# Patient Record
Sex: FEMALE | Race: BLACK OR AFRICAN AMERICAN | URBAN - METROPOLITAN AREA
[De-identification: names, ages, dates, MRNs, and addresses within clinical notes are randomized per-mention and may not be internally consistent; named-entity substitution may affect disease eponyms.]

---

## 2020-07-25 ENCOUNTER — TELEPHONE ENCOUNTER (OUTPATIENT)
Dept: URBAN - METROPOLITAN AREA CLINIC 13 | Facility: CLINIC | Age: 60
End: 2020-07-25

## 2020-07-25 RX ORDER — HYOSCYAMINE SULFATE 0.12 MG/1
PLACE 1 TABLET UNDER THE TONGUE EVERY 4 TO 6 HOURS AS NEEDED TABLET, ORALLY DISINTEGRATING ORAL
Qty: 30 | Refills: 2 | OUTPATIENT
Start: 2018-04-04 | End: 2018-04-25

## 2020-07-25 RX ORDER — CIPROFLOXACIN HYDROCHLORIDE 500 MG/1
TAKE 1 TABLET TWICE DAILY TABLET, FILM COATED ORAL
Qty: 6 | Refills: 0 | OUTPATIENT
End: 2018-06-06

## 2020-07-26 ENCOUNTER — TELEPHONE ENCOUNTER (OUTPATIENT)
Dept: URBAN - METROPOLITAN AREA CLINIC 13 | Facility: CLINIC | Age: 60
End: 2020-07-26

## 2020-07-26 RX ORDER — PSYLLIUM SEED
TAKE AS DIRECTED PACKET (EA) ORAL
Refills: 0 | Status: ACTIVE | COMMUNITY

## 2020-07-26 RX ORDER — IBUPROFEN 800 MG/1
TAKE 1 TABLET TWICE DAILY AS NEEDED TABLET ORAL
Refills: 0 | Status: ACTIVE | COMMUNITY

## 2020-07-26 RX ORDER — HYDROCHLOROTHIAZIDE 25 MG/1
TAKE 1 TABLET DAILY TABLET ORAL
Refills: 0 | Status: ACTIVE | COMMUNITY

## 2020-07-26 RX ORDER — PANTOPRAZOLE SODIUM 40 MG
TAKE 1 TABLET DAILY TABLET, DELAYED RELEASE (ENTERIC COATED) ORAL
Refills: 0 | Status: ACTIVE | COMMUNITY

## 2020-07-26 RX ORDER — CLONAZEPAM 0.25 MG/1
PLACE 1 TABLET ON TONGUE AND ALLOW TO DISSOLVE 3 TIMES DAILY AS NEEDED TABLET, ORALLY DISINTEGRATING ORAL
Qty: 30 | Refills: 3 | Status: ACTIVE | COMMUNITY
Start: 2018-04-09

## 2020-07-26 RX ORDER — METFORMIN HYDROCHLORIDE 500 MG/1
TAKE 1 TABLET EVERY 12 HOURS WITH FOOD TABLET, COATED ORAL
Refills: 0 | Status: ACTIVE | COMMUNITY

## 2020-07-26 RX ORDER — HYDROCODONE BITARTRATE AND ACETAMINOPHEN 5; 325 MG/1; MG/1
TAKE 1 TABLET EVERY 4 TO 6 HOURS AS NEEDED FOR PAIN TABLET ORAL
Refills: 0 | Status: ACTIVE | COMMUNITY

## 2020-07-26 RX ORDER — METOPROLOL SUCCINATE 50 MG/1
TAKE 1 TABLET TWICE DAILY TABLET, EXTENDED RELEASE ORAL
Refills: 0 | Status: ACTIVE | COMMUNITY

## 2020-07-26 RX ORDER — SIMVASTATIN 40 MG/1
TAKE 1 TABLET DAILY AS DIRECTED TABLET, FILM COATED ORAL
Refills: 0 | Status: ACTIVE | COMMUNITY

## 2021-03-01 ENCOUNTER — OFFICE VISIT (OUTPATIENT)
Dept: URBAN - METROPOLITAN AREA CLINIC 113 | Facility: CLINIC | Age: 61
End: 2021-03-01

## 2021-03-01 RX ORDER — METOPROLOL SUCCINATE 50 MG/1
TAKE 1 TABLET TWICE DAILY TABLET, EXTENDED RELEASE ORAL
Refills: 0 | Status: ACTIVE | COMMUNITY

## 2021-03-01 RX ORDER — PSYLLIUM SEED
TAKE AS DIRECTED PACKET (EA) ORAL
Refills: 0 | Status: ACTIVE | COMMUNITY

## 2021-03-01 RX ORDER — SIMVASTATIN 40 MG/1
TAKE 1 TABLET DAILY AS DIRECTED TABLET, FILM COATED ORAL
Refills: 0 | Status: ACTIVE | COMMUNITY

## 2021-03-01 RX ORDER — HYDROCODONE BITARTRATE AND ACETAMINOPHEN 5; 325 MG/1; MG/1
TAKE 1 TABLET EVERY 4 TO 6 HOURS AS NEEDED FOR PAIN TABLET ORAL
Refills: 0 | Status: ACTIVE | COMMUNITY

## 2021-03-01 RX ORDER — PANTOPRAZOLE SODIUM 40 MG
TAKE 1 TABLET DAILY TABLET, DELAYED RELEASE (ENTERIC COATED) ORAL
Refills: 0 | Status: ACTIVE | COMMUNITY

## 2021-03-01 RX ORDER — METFORMIN HYDROCHLORIDE 500 MG/1
TAKE 1 TABLET EVERY 12 HOURS WITH FOOD TABLET, COATED ORAL
Refills: 0 | Status: ACTIVE | COMMUNITY

## 2021-03-01 RX ORDER — IBUPROFEN 800 MG/1
TAKE 1 TABLET TWICE DAILY AS NEEDED TABLET ORAL
Refills: 0 | Status: ACTIVE | COMMUNITY

## 2021-03-01 RX ORDER — HYDROCHLOROTHIAZIDE 25 MG/1
TAKE 1 TABLET DAILY TABLET ORAL
Refills: 0 | Status: ACTIVE | COMMUNITY

## 2021-03-01 RX ORDER — CLONAZEPAM 0.25 MG/1
PLACE 1 TABLET ON TONGUE AND ALLOW TO DISSOLVE 3 TIMES DAILY AS NEEDED TABLET, ORALLY DISINTEGRATING ORAL
Qty: 30 | Refills: 3 | Status: ACTIVE | COMMUNITY
Start: 2018-04-09

## 2021-03-12 ENCOUNTER — OFFICE VISIT (OUTPATIENT)
Dept: URBAN - METROPOLITAN AREA CLINIC 113 | Facility: CLINIC | Age: 61
End: 2021-03-12

## 2021-03-12 RX ORDER — HYDROCODONE BITARTRATE AND ACETAMINOPHEN 5; 325 MG/1; MG/1
TAKE 1 TABLET EVERY 4 TO 6 HOURS AS NEEDED FOR PAIN TABLET ORAL
Refills: 0 | Status: ACTIVE | COMMUNITY

## 2021-03-12 RX ORDER — PSYLLIUM SEED
TAKE AS DIRECTED PACKET (EA) ORAL
Refills: 0 | Status: ACTIVE | COMMUNITY

## 2021-03-12 RX ORDER — CLONAZEPAM 0.25 MG/1
PLACE 1 TABLET ON TONGUE AND ALLOW TO DISSOLVE 3 TIMES DAILY AS NEEDED TABLET, ORALLY DISINTEGRATING ORAL
Qty: 30 | Refills: 3 | Status: ACTIVE | COMMUNITY
Start: 2018-04-09

## 2021-03-12 RX ORDER — HYDROCHLOROTHIAZIDE 25 MG/1
TAKE 1 TABLET DAILY TABLET ORAL
Refills: 0 | Status: ACTIVE | COMMUNITY

## 2021-03-12 RX ORDER — SIMVASTATIN 40 MG/1
TAKE 1 TABLET DAILY AS DIRECTED TABLET, FILM COATED ORAL
Refills: 0 | Status: ACTIVE | COMMUNITY

## 2021-03-12 RX ORDER — METFORMIN HYDROCHLORIDE 500 MG/1
TAKE 1 TABLET EVERY 12 HOURS WITH FOOD TABLET, COATED ORAL
Refills: 0 | Status: ACTIVE | COMMUNITY

## 2021-03-12 RX ORDER — METOPROLOL SUCCINATE 50 MG/1
TAKE 1 TABLET TWICE DAILY TABLET, EXTENDED RELEASE ORAL
Refills: 0 | Status: ACTIVE | COMMUNITY

## 2021-03-12 RX ORDER — PANTOPRAZOLE SODIUM 40 MG
TAKE 1 TABLET DAILY TABLET, DELAYED RELEASE (ENTERIC COATED) ORAL
Refills: 0 | Status: ACTIVE | COMMUNITY

## 2021-03-12 RX ORDER — IBUPROFEN 800 MG/1
TAKE 1 TABLET TWICE DAILY AS NEEDED TABLET ORAL
Refills: 0 | Status: ACTIVE | COMMUNITY

## 2021-03-12 NOTE — HPI-TODAY'S VISIT:
This is a 61-year-old female with a history of diverticulitis, presenting for evaluation of "bowel issues." She was last seen in the office in June 2018 following colonoscopy which was notable for sigmoid and descending colon diverticulosis, grade 1 internal hemorrhoids.  No other abnormalities noted.  Repeat screening was recommended in 2028.  Regarding complaints of constipation, she was recommended a trial of MiraLAX.  She was to continue dietary modification for diverticulosis including avoidance of nuts and seeds.

## 2021-04-15 ENCOUNTER — OFFICE VISIT (OUTPATIENT)
Dept: URBAN - METROPOLITAN AREA CLINIC 113 | Facility: CLINIC | Age: 61
End: 2021-04-15
Payer: COMMERCIAL

## 2021-04-15 ENCOUNTER — WEB ENCOUNTER (OUTPATIENT)
Dept: URBAN - METROPOLITAN AREA CLINIC 113 | Facility: CLINIC | Age: 61
End: 2021-04-15

## 2021-04-15 VITALS
RESPIRATION RATE: 18 BRPM | SYSTOLIC BLOOD PRESSURE: 118 MMHG | HEIGHT: 65 IN | BODY MASS INDEX: 30.66 KG/M2 | WEIGHT: 184 LBS | DIASTOLIC BLOOD PRESSURE: 78 MMHG | HEART RATE: 74 BPM | TEMPERATURE: 98.1 F

## 2021-04-15 DIAGNOSIS — K59.01 CONSTIPATION BY DELAYED COLONIC TRANSIT: ICD-10-CM

## 2021-04-15 DIAGNOSIS — R14.0 BLOATING: ICD-10-CM

## 2021-04-15 PROCEDURE — 99203 OFFICE O/P NEW LOW 30 MIN: CPT | Performed by: NURSE PRACTITIONER

## 2021-04-15 RX ORDER — SIMVASTATIN 40 MG/1
TAKE 1 TABLET DAILY AS DIRECTED TABLET, FILM COATED ORAL
Refills: 0 | Status: ACTIVE | COMMUNITY

## 2021-04-15 RX ORDER — HYDROCHLOROTHIAZIDE 25 MG/1
TAKE 1 TABLET DAILY TABLET ORAL
Refills: 0 | Status: ACTIVE | COMMUNITY

## 2021-04-15 RX ORDER — METOPROLOL SUCCINATE 50 MG/1
TAKE 1 TABLET TWICE DAILY TABLET, EXTENDED RELEASE ORAL
Refills: 0 | Status: ACTIVE | COMMUNITY

## 2021-04-15 RX ORDER — METFORMIN HYDROCHLORIDE 500 MG/1
TAKE 1 TABLET EVERY 12 HOURS WITH FOOD TABLET, COATED ORAL
Refills: 0 | Status: ACTIVE | COMMUNITY

## 2021-04-15 RX ORDER — PSYLLIUM SEED
TAKE AS DIRECTED PACKET (EA) ORAL
Refills: 0 | Status: ACTIVE | COMMUNITY

## 2021-04-15 RX ORDER — HYDROCODONE BITARTRATE AND ACETAMINOPHEN 5; 325 MG/1; MG/1
TAKE 1 TABLET EVERY 4 TO 6 HOURS AS NEEDED FOR PAIN TABLET ORAL
Refills: 0 | Status: DISCONTINUED | COMMUNITY

## 2021-04-15 RX ORDER — IBUPROFEN 800 MG/1
TAKE 1 TABLET TWICE DAILY AS NEEDED TABLET ORAL
Refills: 0 | Status: DISCONTINUED | COMMUNITY

## 2021-04-15 RX ORDER — PANTOPRAZOLE SODIUM 40 MG
TAKE 1 TABLET DAILY TABLET, DELAYED RELEASE (ENTERIC COATED) ORAL
Refills: 0 | Status: ACTIVE | COMMUNITY

## 2021-04-15 RX ORDER — CLONAZEPAM 0.25 MG/1
PLACE 1 TABLET ON TONGUE AND ALLOW TO DISSOLVE 3 TIMES DAILY AS NEEDED TABLET, ORALLY DISINTEGRATING ORAL
Qty: 30 | Refills: 3 | Status: DISCONTINUED | COMMUNITY
Start: 2018-04-09

## 2021-04-15 NOTE — HPI-TODAY'S VISIT:
61-year-old woman referred by Dr. Ovalles for colon cancer screening.  A copy of today's visit will be forwarded to the referring provider.  She was last seen in the office in June 2018 for constipation.  A screening colonoscopy at that time revealed good bowel preparation, diverticulosis, grade 1 internal hemorrhoids, otherwise no abnormalities.  Repeat colonoscopy was recommended in 2028 for colon cancer screening.  She complains of bloating, noisy stomach and changes in bowel movements. She has constipation predominant bowel habits with bowels moving every 3 days. Sometimes bowel movements are small and unrelieving. She reports a fullness in her abdomen, especially when she eats bread. There is no blood per rectum. Bloating sensation improves with bowel movments, passing gas or belching. No fever or chills. No nausea or vomiting. Her weight is stable.   Review of records:  Abdominal ultrasound 10/1/2020:Hepatic steatosis.  Ill-defined hypoechogenicity adjacent to the gallbladder, most likely reflecting focal fatty sparing.  However, this may be correlated with CT of the abdomen if clinically indicated.  Bilateral peripelvic cysts. CT of the abdomen and pelvis with and without contrast on 7/23/2020: No evidence of acute process.  A 25 mm enhancing lesion right lobe of the liver unchanged as far back as 3/17/2018, probably hemangioma although focal nodular hyperplasia and adenoma are also within the differential.  Low attenuation liver consistent with steatosis.  Status post hysterectomy. Labs 12/28/2020:Glucose 112, BUN 13, creatinine 0.61, T bili 0.5, ALP 95, AST 31, ALT 45, hemoglobin A1c 7.2 Labs 9/17/2020:T bili 0.3, ALP 79, AST 46, ALT 79, negative hepatitis panel

## 2022-04-21 ENCOUNTER — OFFICE VISIT (OUTPATIENT)
Dept: URBAN - METROPOLITAN AREA CLINIC 113 | Facility: CLINIC | Age: 62
End: 2022-04-21
Payer: COMMERCIAL

## 2022-04-21 ENCOUNTER — WEB ENCOUNTER (OUTPATIENT)
Dept: URBAN - METROPOLITAN AREA CLINIC 113 | Facility: CLINIC | Age: 62
End: 2022-04-21

## 2022-04-21 VITALS
DIASTOLIC BLOOD PRESSURE: 79 MMHG | SYSTOLIC BLOOD PRESSURE: 122 MMHG | HEART RATE: 69 BPM | BODY MASS INDEX: 29.82 KG/M2 | TEMPERATURE: 97.8 F | WEIGHT: 179 LBS | HEIGHT: 65 IN

## 2022-04-21 DIAGNOSIS — R14.0 BLOATING: ICD-10-CM

## 2022-04-21 DIAGNOSIS — K57.92 DIVERTICULITIS: ICD-10-CM

## 2022-04-21 DIAGNOSIS — K59.01 CONSTIPATION BY DELAYED COLONIC TRANSIT: ICD-10-CM

## 2022-04-21 PROBLEM — 35298007: Status: ACTIVE | Noted: 2021-04-15

## 2022-04-21 PROBLEM — 307496006: Status: ACTIVE | Noted: 2022-04-21

## 2022-04-21 PROCEDURE — 99214 OFFICE O/P EST MOD 30 MIN: CPT | Performed by: NURSE PRACTITIONER

## 2022-04-21 RX ORDER — HYDROCHLOROTHIAZIDE 25 MG/1
TAKE 1 TABLET DAILY TABLET ORAL
Refills: 0 | Status: ACTIVE | COMMUNITY

## 2022-04-21 RX ORDER — METFORMIN HYDROCHLORIDE 500 MG/1
TAKE 1 TABLET EVERY 12 HOURS WITH FOOD TABLET, COATED ORAL
Refills: 0 | Status: ACTIVE | COMMUNITY

## 2022-04-21 RX ORDER — HYOSCYAMINE SULFATE 0.12 MG/1
1 TABLET UNDER THE TONGUE AND ALLOW TO DISSOLVE  AS NEEDED TABLET SUBLINGUAL
Qty: 90 | Refills: 3 | OUTPATIENT
Start: 2022-04-21 | End: 2022-08-19

## 2022-04-21 RX ORDER — METOPROLOL SUCCINATE 50 MG/1
TAKE 1 TABLET TWICE DAILY TABLET, EXTENDED RELEASE ORAL
Refills: 0 | Status: ACTIVE | COMMUNITY

## 2022-04-21 RX ORDER — PSYLLIUM SEED
TAKE AS DIRECTED PACKET (EA) ORAL
Refills: 0 | Status: ACTIVE | COMMUNITY

## 2022-04-21 RX ORDER — PANTOPRAZOLE SODIUM 40 MG
TAKE 1 TABLET DAILY TABLET, DELAYED RELEASE (ENTERIC COATED) ORAL
Refills: 0 | Status: ACTIVE | COMMUNITY

## 2022-04-21 RX ORDER — SIMVASTATIN 40 MG/1
TAKE 1 TABLET DAILY AS DIRECTED TABLET, FILM COATED ORAL
Refills: 0 | Status: ACTIVE | COMMUNITY

## 2022-04-21 NOTE — HPI-TODAY'S VISIT:
61 yo with a history of bloating and constipation, presenting for evaluation of "diverticulitis and side pain."  She was seen in the office 4/15/21 for complaints of bloating and constipation. CT and colonoscopy were unremarkable. She was to use MiraLAX daily and begin a low FODMAP diet.  She was seen in the ED on 4/15/22. She described a recent flare of diverticulitis, for which she had been placed on a 2 week course of antibiotics, ? Cipro and Flagyl. She presented to the ED for persistent LLQ pain, characterized as sharp, achy and constant. She also endorsed constipation. Labs in the ED showed a normal WBC at 6.35. CT a/p with contrast showed no acute process, specifically no diverticulitis. She was discharged from the ED with Augmentin BID for 10 days.  She continues with intermittent left sided abdominal pain. Her bowels are moving very slowly, and she feels constipated. Her last good bowel movement was likely Saturday. She was recommended to use Linzess, but this made her feel more gassy and crampy-like. There is no blood per rectum. There is no nausea currently, and no vomiting. Her weight is stable.

## 2022-05-04 ENCOUNTER — OFFICE VISIT (OUTPATIENT)
Dept: URBAN - METROPOLITAN AREA CLINIC 113 | Facility: CLINIC | Age: 62
End: 2022-05-04

## 2022-05-13 ENCOUNTER — ERX REFILL RESPONSE (OUTPATIENT)
Dept: URBAN - METROPOLITAN AREA CLINIC 113 | Facility: CLINIC | Age: 62
End: 2022-05-13

## 2022-05-13 RX ORDER — HYOSCYAMINE SULFATE 0.12 MG/1
1 TABLET UNDER THE TONGUE AND ALLOW TO DISSOLVE  AS NEEDED TABLET SUBLINGUAL
Qty: 90 | Refills: 3 | OUTPATIENT

## 2022-05-13 RX ORDER — HYOSCYAMINE SULFATE 0.12 MG/1
ALLOW 1 TABLET UNDER THE TONGUE 3 TIMES A DAY AS NEEDED TABLET ORAL; SUBLINGUAL
Qty: 90 TABLET | Refills: 4 | OUTPATIENT

## 2022-05-18 ENCOUNTER — OFFICE VISIT (OUTPATIENT)
Dept: URBAN - METROPOLITAN AREA CLINIC 113 | Facility: CLINIC | Age: 62
End: 2022-05-18

## 2023-10-10 ENCOUNTER — OFFICE VISIT (OUTPATIENT)
Dept: URBAN - METROPOLITAN AREA CLINIC 113 | Facility: CLINIC | Age: 63
End: 2023-10-10

## 2023-10-13 ENCOUNTER — OFFICE VISIT (OUTPATIENT)
Dept: URBAN - METROPOLITAN AREA CLINIC 113 | Facility: CLINIC | Age: 63
End: 2023-10-13
Payer: COMMERCIAL

## 2023-10-13 VITALS
DIASTOLIC BLOOD PRESSURE: 79 MMHG | HEART RATE: 70 BPM | BODY MASS INDEX: 30.32 KG/M2 | SYSTOLIC BLOOD PRESSURE: 131 MMHG | HEIGHT: 65 IN | TEMPERATURE: 97.1 F | RESPIRATION RATE: 16 BRPM | WEIGHT: 182 LBS

## 2023-10-13 DIAGNOSIS — R10.32 LEFT LOWER QUADRANT PAIN: ICD-10-CM

## 2023-10-13 DIAGNOSIS — K59.01 CONSTIPATION BY DELAYED COLONIC TRANSIT: ICD-10-CM

## 2023-10-13 DIAGNOSIS — R10.31 RIGHT LOWER QUADRANT PAIN: ICD-10-CM

## 2023-10-13 PROCEDURE — 99214 OFFICE O/P EST MOD 30 MIN: CPT | Performed by: NURSE PRACTITIONER

## 2023-10-13 RX ORDER — METFORMIN HYDROCHLORIDE 500 MG/1
TAKE 1 TABLET EVERY 12 HOURS WITH FOOD TABLET, COATED ORAL
Refills: 0 | Status: ACTIVE | COMMUNITY

## 2023-10-13 RX ORDER — METOPROLOL SUCCINATE 50 MG/1
TAKE 1 TABLET TWICE DAILY TABLET, EXTENDED RELEASE ORAL
Refills: 0 | Status: ACTIVE | COMMUNITY

## 2023-10-13 RX ORDER — HYDROCHLOROTHIAZIDE 25 MG/1
TAKE 1 TABLET DAILY TABLET ORAL
Refills: 0 | Status: ACTIVE | COMMUNITY

## 2023-10-13 RX ORDER — PANTOPRAZOLE SODIUM 40 MG
TAKE 1 TABLET DAILY TABLET, DELAYED RELEASE (ENTERIC COATED) ORAL
Refills: 0 | Status: ACTIVE | COMMUNITY

## 2023-10-13 RX ORDER — SIMVASTATIN 40 MG/1
TAKE 1 TABLET DAILY AS DIRECTED TABLET, FILM COATED ORAL
Refills: 0 | Status: ACTIVE | COMMUNITY

## 2023-10-13 RX ORDER — HYOSCYAMINE SULFATE 0.12 MG/1
ALLOW 1 TABLET UNDER THE TONGUE 3 TIMES A DAY AS NEEDED TABLET ORAL; SUBLINGUAL
Qty: 90 TABLET | Refills: 4 | Status: ACTIVE | COMMUNITY

## 2023-10-13 RX ORDER — DULAGLUTIDE 3 MG/.5ML
AS DIRECTED INJECTION, SOLUTION SUBCUTANEOUS
Status: ACTIVE | COMMUNITY

## 2023-10-13 RX ORDER — HYOSCYAMINE SULFATE 0.12 MG/1
1 TABLET UNDER THE TONGUE AND ALLOW TO DISSOLVE  AS NEEDED TABLET SUBLINGUAL
Qty: 90 | Refills: 3 | OUTPATIENT
Start: 2023-10-13 | End: 2024-02-09

## 2023-10-13 RX ORDER — PSYLLIUM SEED
TAKE AS DIRECTED PACKET (EA) ORAL
Refills: 0 | Status: ACTIVE | COMMUNITY

## 2023-10-13 NOTE — HPI-TODAY'S VISIT:
63-year-old woman presenting for follow-up regarding constipation predominant bowel habits.  She was seen in the office April 2022 following a recent episode of diverticulitis.  She was recommended to use hyoscyamine as needed for abdominal cramping.  She was recommended a bottle of magnesium citrate followed by MiraLAX daily for bowel regimen.  She was recommended 6-week follow-up, however has been lost to follow-up.  Her last colonoscopy was in 2018 with Dr. Noyola, and was unremarkable for any polyps.  She did have diverticulosis.  She is taking Linzess for management of constipation. She is not sure what dose she is taking. She does not tolerate taking her Linzess daily. When she takes this, she has significant diarrhea. There is no blood per rectum. There is occasional cramping. She does not recall if hysocyamine was helpful. Her weight is stable. Appetite is fair. No nausea or vomiting. She has bloating with increased constipation.

## 2023-10-16 ENCOUNTER — TELEPHONE ENCOUNTER (OUTPATIENT)
Dept: URBAN - METROPOLITAN AREA CLINIC 13 | Facility: CLINIC | Age: 63
End: 2023-10-16

## 2023-10-16 RX ORDER — HYOSCYAMINE SULFATE 0.12 MG/1
ALLOW 1 TABLET UNDER THE TONGUE 3 TIMES A DAY AS NEEDED TABLET ORAL; SUBLINGUAL
Qty: 90 TABLET | Refills: 4 | Status: ACTIVE | COMMUNITY

## 2023-10-16 RX ORDER — PANTOPRAZOLE SODIUM 40 MG
TAKE 1 TABLET DAILY TABLET, DELAYED RELEASE (ENTERIC COATED) ORAL
Refills: 0 | Status: ACTIVE | COMMUNITY

## 2023-10-16 RX ORDER — LINACLOTIDE 145 UG/1
1 CAPSULE AT LEAST 30 MINUTES BEFORE THE FIRST MEAL OF THE DAY ON AN EMPTY STOMACH CAPSULE, GELATIN COATED ORAL ONCE A DAY
Qty: 30 | Refills: 3

## 2023-10-16 RX ORDER — METOPROLOL SUCCINATE 50 MG/1
TAKE 1 TABLET TWICE DAILY TABLET, EXTENDED RELEASE ORAL
Refills: 0 | Status: ACTIVE | COMMUNITY

## 2023-10-16 RX ORDER — LINACLOTIDE 145 UG/1
1 CAPSULE AT LEAST 30 MINUTES BEFORE THE FIRST MEAL OF THE DAY ON AN EMPTY STOMACH CAPSULE, GELATIN COATED ORAL ONCE A DAY
Status: ACTIVE | COMMUNITY

## 2023-10-16 RX ORDER — HYOSCYAMINE SULFATE 0.12 MG/1
1 TABLET UNDER THE TONGUE AND ALLOW TO DISSOLVE  AS NEEDED TABLET SUBLINGUAL
Qty: 90 | Refills: 3 | Status: ACTIVE | COMMUNITY
Start: 2023-10-13 | End: 2024-02-09

## 2023-10-16 RX ORDER — PSYLLIUM SEED
TAKE AS DIRECTED PACKET (EA) ORAL
Refills: 0 | Status: ACTIVE | COMMUNITY

## 2023-10-16 RX ORDER — DULAGLUTIDE 3 MG/.5ML
AS DIRECTED INJECTION, SOLUTION SUBCUTANEOUS
Status: ACTIVE | COMMUNITY

## 2023-10-16 RX ORDER — METFORMIN HYDROCHLORIDE 500 MG/1
TAKE 1 TABLET EVERY 12 HOURS WITH FOOD TABLET, COATED ORAL
Refills: 0 | Status: ACTIVE | COMMUNITY

## 2023-10-16 RX ORDER — SIMVASTATIN 40 MG/1
TAKE 1 TABLET DAILY AS DIRECTED TABLET, FILM COATED ORAL
Refills: 0 | Status: ACTIVE | COMMUNITY

## 2023-10-16 RX ORDER — HYDROCHLOROTHIAZIDE 25 MG/1
TAKE 1 TABLET DAILY TABLET ORAL
Refills: 0 | Status: ACTIVE | COMMUNITY

## 2023-12-26 ENCOUNTER — OFFICE VISIT (OUTPATIENT)
Dept: URBAN - METROPOLITAN AREA CLINIC 113 | Facility: CLINIC | Age: 63
End: 2023-12-26
Payer: COMMERCIAL

## 2023-12-26 VITALS
SYSTOLIC BLOOD PRESSURE: 122 MMHG | TEMPERATURE: 97.3 F | HEIGHT: 65 IN | DIASTOLIC BLOOD PRESSURE: 75 MMHG | WEIGHT: 172 LBS | RESPIRATION RATE: 18 BRPM | BODY MASS INDEX: 28.66 KG/M2 | HEART RATE: 70 BPM

## 2023-12-26 DIAGNOSIS — R10.32 LEFT LOWER QUADRANT PAIN: ICD-10-CM

## 2023-12-26 DIAGNOSIS — R10.31 RIGHT LOWER QUADRANT PAIN: ICD-10-CM

## 2023-12-26 DIAGNOSIS — K59.01 CONSTIPATION BY DELAYED COLONIC TRANSIT: ICD-10-CM

## 2023-12-26 PROCEDURE — 99214 OFFICE O/P EST MOD 30 MIN: CPT | Performed by: INTERNAL MEDICINE

## 2023-12-26 RX ORDER — LINACLOTIDE 145 UG/1
1 CAPSULE AT LEAST 30 MINUTES BEFORE THE FIRST MEAL OF THE DAY ON AN EMPTY STOMACH CAPSULE, GELATIN COATED ORAL ONCE A DAY
Qty: 30 | Refills: 3 | Status: ON HOLD | COMMUNITY

## 2023-12-26 RX ORDER — EVOLOCUMAB 140 MG/ML
1 ML INJECTION, SOLUTION SUBCUTANEOUS
Status: ACTIVE | COMMUNITY

## 2023-12-26 RX ORDER — METFORMIN HYDROCHLORIDE 500 MG/1
TAKE 1 TABLET EVERY 12 HOURS WITH FOOD TABLET, COATED ORAL
Refills: 0 | Status: ACTIVE | COMMUNITY

## 2023-12-26 RX ORDER — SIMVASTATIN 40 MG/1
TAKE 1 TABLET DAILY AS DIRECTED TABLET, FILM COATED ORAL
Refills: 0 | Status: ACTIVE | COMMUNITY

## 2023-12-26 RX ORDER — DULAGLUTIDE 3 MG/.5ML
AS DIRECTED INJECTION, SOLUTION SUBCUTANEOUS
Status: ACTIVE | COMMUNITY

## 2023-12-26 RX ORDER — HYDROCHLOROTHIAZIDE 25 MG/1
TAKE 1 TABLET DAILY TABLET ORAL
Refills: 0 | Status: ACTIVE | COMMUNITY

## 2023-12-26 RX ORDER — PSYLLIUM SEED
TAKE AS DIRECTED PACKET (EA) ORAL
Refills: 0 | Status: ACTIVE | COMMUNITY

## 2023-12-26 RX ORDER — HYOSCYAMINE SULFATE 0.12 MG/1
1 TABLET UNDER THE TONGUE AND ALLOW TO DISSOLVE  AS NEEDED TABLET SUBLINGUAL
Qty: 90 | Refills: 3 | Status: ACTIVE | COMMUNITY
Start: 2023-10-13 | End: 2024-02-09

## 2023-12-26 RX ORDER — HYOSCYAMINE SULFATE 0.12 MG/1
1 TABLET UNDER THE TONGUE AND ALLOW TO DISSOLVE  AS NEEDED TABLET SUBLINGUAL
Qty: 90 | Refills: 3 | OUTPATIENT

## 2023-12-26 RX ORDER — METOPROLOL SUCCINATE 50 MG/1
TAKE 1 TABLET TWICE DAILY TABLET, EXTENDED RELEASE ORAL
Refills: 0 | Status: ACTIVE | COMMUNITY

## 2023-12-26 RX ORDER — HYOSCYAMINE SULFATE 0.12 MG/1
ALLOW 1 TABLET UNDER THE TONGUE 3 TIMES A DAY AS NEEDED TABLET ORAL; SUBLINGUAL
Qty: 90 TABLET | Refills: 4 | Status: ACTIVE | COMMUNITY

## 2023-12-26 RX ORDER — LINACLOTIDE 145 UG/1
1 CAPSULE AT LEAST 30 MINUTES BEFORE THE FIRST MEAL OF THE DAY ON AN EMPTY STOMACH CAPSULE, GELATIN COATED ORAL ONCE A DAY
Qty: 90 | Refills: 3 | OUTPATIENT
Start: 2023-12-26 | End: 2024-12-20

## 2023-12-26 RX ORDER — PANTOPRAZOLE SODIUM 40 MG
TAKE 1 TABLET DAILY TABLET, DELAYED RELEASE (ENTERIC COATED) ORAL
Refills: 0 | Status: ACTIVE | COMMUNITY

## 2023-12-26 NOTE — HPI-TODAY'S VISIT:
Ms. Cotton is a 63-year-old woman presenting for follow-up regarding constipation predominant bowel habits.  She was given a bowel purge at her last office visit.  She will often miss a few days without a BM.  She will only have 1-2 BM a week.  She still has a lot of bloating and abdominal discomfort.  She was on Linzess 145mcg most days, but will hold it if she has diarrhea.  She recently has not been taking it regularly.    Her last colonoscopy was in 2018 with Dr. Noyola, and was unremarkable for any polyps.  She did have diverticulosis.

## 2024-01-02 ENCOUNTER — TELEPHONE ENCOUNTER (OUTPATIENT)
Dept: URBAN - METROPOLITAN AREA CLINIC 113 | Facility: CLINIC | Age: 64
End: 2024-01-02

## 2024-01-02 RX ORDER — HYOSCYAMINE SULFATE 0.12 MG/1
1 TABLET UNDER THE TONGUE AND ALLOW TO DISSOLVE  AS NEEDED TABLET SUBLINGUAL
Qty: 90 | Refills: 3 | OUTPATIENT
Start: 2024-01-02 | End: 2024-05-01

## 2024-02-23 ENCOUNTER — LAB (OUTPATIENT)
Dept: URBAN - METROPOLITAN AREA CLINIC 113 | Facility: CLINIC | Age: 64
End: 2024-02-23

## 2024-02-23 ENCOUNTER — OV EP (OUTPATIENT)
Dept: URBAN - METROPOLITAN AREA CLINIC 113 | Facility: CLINIC | Age: 64
End: 2024-02-23
Payer: COMMERCIAL

## 2024-02-23 VITALS
DIASTOLIC BLOOD PRESSURE: 66 MMHG | RESPIRATION RATE: 16 BRPM | HEIGHT: 65 IN | TEMPERATURE: 97.4 F | WEIGHT: 181 LBS | BODY MASS INDEX: 30.16 KG/M2 | HEART RATE: 61 BPM | SYSTOLIC BLOOD PRESSURE: 111 MMHG

## 2024-02-23 DIAGNOSIS — R10.32 LLQ PAIN: ICD-10-CM

## 2024-02-23 DIAGNOSIS — K59.09 CHRONIC CONSTIPATION: ICD-10-CM

## 2024-02-23 DIAGNOSIS — R14.0 ABDOMINAL BLOATING: ICD-10-CM

## 2024-02-23 PROCEDURE — 99214 OFFICE O/P EST MOD 30 MIN: CPT | Performed by: NURSE PRACTITIONER

## 2024-02-23 RX ORDER — HYOSCYAMINE SULFATE 0.12 MG/1
1 TABLET UNDER THE TONGUE AND ALLOW TO DISSOLVE  AS NEEDED TABLET SUBLINGUAL
Qty: 90 | Refills: 3 | Status: ON HOLD | COMMUNITY
Start: 2024-01-02 | End: 2024-05-01

## 2024-02-23 RX ORDER — LINACLOTIDE 72 UG/1
1 CAPSULE AT LEAST 30 MINUTES BEFORE THE FIRST MEAL OF THE DAY ON AN EMPTY STOMACH CAPSULE, GELATIN COATED ORAL ONCE A DAY
Qty: 90 | Refills: 3 | Status: ACTIVE | COMMUNITY
Start: 2023-12-26 | End: 2024-12-20

## 2024-02-23 RX ORDER — PSYLLIUM SEED
TAKE AS DIRECTED PACKET (EA) ORAL
Refills: 0 | Status: ACTIVE | COMMUNITY

## 2024-02-23 RX ORDER — EVOLOCUMAB 140 MG/ML
1 ML INJECTION, SOLUTION SUBCUTANEOUS
Status: ACTIVE | COMMUNITY

## 2024-02-23 RX ORDER — HYOSCYAMINE SULFATE 0.12 MG/1
ALLOW 1 TABLET UNDER THE TONGUE 3 TIMES A DAY AS NEEDED TABLET ORAL; SUBLINGUAL
Qty: 90 TABLET | Refills: 4 | Status: ON HOLD | COMMUNITY

## 2024-02-23 RX ORDER — METFORMIN HYDROCHLORIDE 500 MG/1
TAKE 1 TABLET EVERY 12 HOURS WITH FOOD TABLET, COATED ORAL
Refills: 0 | Status: ACTIVE | COMMUNITY

## 2024-02-23 RX ORDER — DULAGLUTIDE 3 MG/.5ML
AS DIRECTED INJECTION, SOLUTION SUBCUTANEOUS
Status: ACTIVE | COMMUNITY

## 2024-02-23 RX ORDER — METOPROLOL SUCCINATE 50 MG/1
TAKE 1 TABLET TWICE DAILY TABLET, EXTENDED RELEASE ORAL
Refills: 0 | Status: ACTIVE | COMMUNITY

## 2024-02-23 RX ORDER — HYDROCHLOROTHIAZIDE 25 MG/1
TAKE 1 TABLET DAILY TABLET ORAL
Refills: 0 | Status: ACTIVE | COMMUNITY

## 2024-02-23 RX ORDER — HYOSCYAMINE SULFATE 0.12 MG/1
1 TABLET UNDER THE TONGUE AND ALLOW TO DISSOLVE  AS NEEDED TABLET SUBLINGUAL
Qty: 90 | Refills: 3 | OUTPATIENT
Start: 2024-02-23 | End: 2024-06-22

## 2024-02-23 RX ORDER — LINACLOTIDE 145 UG/1
1 CAPSULE AT LEAST 30 MINUTES BEFORE THE FIRST MEAL OF THE DAY ON AN EMPTY STOMACH CAPSULE, GELATIN COATED ORAL ONCE A DAY
Qty: 30 | Refills: 3 | Status: ON HOLD | COMMUNITY

## 2024-02-23 RX ORDER — LINACLOTIDE 72 UG/1
1 CAPSULE AT LEAST 30 MINUTES BEFORE THE FIRST MEAL OF THE DAY ON AN EMPTY STOMACH CAPSULE, GELATIN COATED ORAL ONCE A DAY
OUTPATIENT
Start: 2023-12-26

## 2024-02-23 RX ORDER — PANTOPRAZOLE SODIUM 40 MG
TAKE 1 TABLET DAILY TABLET, DELAYED RELEASE (ENTERIC COATED) ORAL
Refills: 0 | Status: ACTIVE | COMMUNITY

## 2024-02-23 RX ORDER — SIMVASTATIN 40 MG/1
TAKE 1 TABLET DAILY AS DIRECTED TABLET, FILM COATED ORAL
Refills: 0 | Status: ACTIVE | COMMUNITY

## 2024-02-23 RX ORDER — HYOSCYAMINE SULFATE 0.12 MG/1
1 TABLET UNDER THE TONGUE AND ALLOW TO DISSOLVE  AS NEEDED TABLET SUBLINGUAL
Qty: 90 | Refills: 3 | Status: ON HOLD | COMMUNITY

## 2024-02-23 NOTE — HPI-TODAY'S VISIT:
This is a 64-year-old woman presenting for a new complaint of left esophagus pain. She was seen in the office in June 2018 by Dr. Varun Noyola for complaints of constipation.  She underwent a screening colonoscopy at that time which was unremarkable.  Repeat colonoscopy was recommended in 10 years for screening purposes.  She followed up in the office in 2021 with complaints of constipation, bloating and lower abdominal discomfort.  She was recommended a low FODMAP diet and bowel regimen using MiraLAX daily.  She was seen in the office in May 2022 following an episode of diverticulitis which was treated with Cipro, Flagyl and Augmentin.  She was recommended an antispasmodic to use as needed and continue bowel regimen with MiraLAX.  A colonoscopy was a consideration.  She return to clinic in October 2023 for continued complaints of lower abdominal pain and constipation.  She was again provided a bowel cleanse with MiraLAX and Gatorade followed by Linzess daily.  She was seen more urgently in the office in December 2023 for constipation predominant bowel habits and bloating.  She was recommended consistent use of Linzess 145 mcg daily to every other day instead of as needed.  She continues with complaints of constipation, and bloating/ increased flatulence. She is taking Linzess 72 mcg daily. On this regimen, she is able to have a bowel movement daily. She does note some LLQ discomfort characterized as a soreness. Levsin helps with pain, and Linzess keeps her bowels moving. She tells me that she does not have a good diet. She is trying to eat more salads.

## 2024-04-03 ENCOUNTER — OV EP (OUTPATIENT)
Dept: URBAN - METROPOLITAN AREA CLINIC 113 | Facility: CLINIC | Age: 64
End: 2024-04-03
Payer: COMMERCIAL

## 2024-04-03 VITALS
DIASTOLIC BLOOD PRESSURE: 71 MMHG | SYSTOLIC BLOOD PRESSURE: 110 MMHG | HEART RATE: 70 BPM | BODY MASS INDEX: 28.99 KG/M2 | TEMPERATURE: 97.1 F | WEIGHT: 174 LBS | RESPIRATION RATE: 14 BRPM | HEIGHT: 65 IN

## 2024-04-03 DIAGNOSIS — R10.32 LLQ PAIN: ICD-10-CM

## 2024-04-03 DIAGNOSIS — K59.09 CHRONIC CONSTIPATION: ICD-10-CM

## 2024-04-03 DIAGNOSIS — R14.0 ABDOMINAL BLOATING: ICD-10-CM

## 2024-04-03 PROCEDURE — 99214 OFFICE O/P EST MOD 30 MIN: CPT | Performed by: NURSE PRACTITIONER

## 2024-04-03 RX ORDER — DULAGLUTIDE 3 MG/.5ML
AS DIRECTED INJECTION, SOLUTION SUBCUTANEOUS
Status: ON HOLD | COMMUNITY

## 2024-04-03 RX ORDER — PANTOPRAZOLE SODIUM 40 MG
TAKE 1 TABLET DAILY TABLET, DELAYED RELEASE (ENTERIC COATED) ORAL
Refills: 0 | Status: ACTIVE | COMMUNITY

## 2024-04-03 RX ORDER — HYDROCHLOROTHIAZIDE 25 MG/1
TAKE 1 TABLET DAILY TABLET ORAL
Refills: 0 | Status: ACTIVE | COMMUNITY

## 2024-04-03 RX ORDER — LINACLOTIDE 145 UG/1
1 CAPSULE AT LEAST 30 MINUTES BEFORE THE FIRST MEAL OF THE DAY ON AN EMPTY STOMACH CAPSULE, GELATIN COATED ORAL ONCE A DAY
Qty: 30 | Refills: 3 | Status: ON HOLD | COMMUNITY

## 2024-04-03 RX ORDER — BEMPEDOIC ACID AND EZETIMIBE 180; 10 MG/1; MG/1
1 TABLET TABLET, FILM COATED ORAL ONCE A DAY
Status: ACTIVE | COMMUNITY

## 2024-04-03 RX ORDER — LINACLOTIDE 72 UG/1
1 CAPSULE AT LEAST 30 MINUTES BEFORE THE FIRST MEAL OF THE DAY ON AN EMPTY STOMACH CAPSULE, GELATIN COATED ORAL ONCE A DAY
OUTPATIENT

## 2024-04-03 RX ORDER — HYOSCYAMINE SULFATE 0.12 MG/1
1 TABLET UNDER THE TONGUE AND ALLOW TO DISSOLVE  AS NEEDED TABLET SUBLINGUAL
Qty: 90 | Refills: 3 | Status: ACTIVE | COMMUNITY
Start: 2024-02-23 | End: 2024-06-22

## 2024-04-03 RX ORDER — HYOSCYAMINE SULFATE 0.12 MG/1
1 TABLET UNDER THE TONGUE AND ALLOW TO DISSOLVE  AS NEEDED TABLET SUBLINGUAL
Qty: 90 | Refills: 3 | Status: ON HOLD | COMMUNITY

## 2024-04-03 RX ORDER — SIMVASTATIN 40 MG/1
TAKE 1 TABLET DAILY AS DIRECTED TABLET, FILM COATED ORAL
Refills: 0 | Status: ON HOLD | COMMUNITY

## 2024-04-03 RX ORDER — HYOSCYAMINE SULFATE 0.12 MG/1
1 TABLET UNDER THE TONGUE AND ALLOW TO DISSOLVE  AS NEEDED TABLET SUBLINGUAL
Qty: 90 | Refills: 3 | Status: ON HOLD | COMMUNITY
Start: 2024-01-02 | End: 2024-05-01

## 2024-04-03 RX ORDER — PSYLLIUM SEED
TAKE AS DIRECTED PACKET (EA) ORAL
Refills: 0 | Status: ACTIVE | COMMUNITY

## 2024-04-03 RX ORDER — HYOSCYAMINE SULFATE 0.12 MG/1
1 TABLET UNDER THE TONGUE AND ALLOW TO DISSOLVE  AS NEEDED TABLET SUBLINGUAL
Qty: 90 | Refills: 3 | OUTPATIENT

## 2024-04-03 RX ORDER — METOPROLOL SUCCINATE 50 MG/1
TAKE 1 TABLET TWICE DAILY TABLET, EXTENDED RELEASE ORAL
Refills: 0 | Status: ACTIVE | COMMUNITY

## 2024-04-03 RX ORDER — EVOLOCUMAB 140 MG/ML
1 ML INJECTION, SOLUTION SUBCUTANEOUS
Status: ACTIVE | COMMUNITY

## 2024-04-03 RX ORDER — METFORMIN HYDROCHLORIDE 500 MG/1
TAKE 1 TABLET EVERY 12 HOURS WITH FOOD TABLET, COATED ORAL
Refills: 0 | Status: ACTIVE | COMMUNITY

## 2024-04-03 RX ORDER — HYOSCYAMINE SULFATE 0.12 MG/1
ALLOW 1 TABLET UNDER THE TONGUE 3 TIMES A DAY AS NEEDED TABLET ORAL; SUBLINGUAL
Qty: 90 TABLET | Refills: 4 | Status: ON HOLD | COMMUNITY

## 2024-04-03 RX ORDER — LINACLOTIDE 72 UG/1
1 CAPSULE AT LEAST 30 MINUTES BEFORE THE FIRST MEAL OF THE DAY ON AN EMPTY STOMACH CAPSULE, GELATIN COATED ORAL ONCE A DAY
Status: ACTIVE | COMMUNITY
Start: 2023-12-26

## 2024-04-03 NOTE — HPI-OTHER HISTORIES
Colonoscopy on 5/16/2018 by Dr. Noyola revealed multiple left colonic diverticuli, grade 1 internal hemorrhoids, no colon polyps.

## 2024-04-03 NOTE — HPI-TODAY'S VISIT:
This is a 64-year-old woman presenting for a new complaint of left esophagus pain. She was seen in the office in June 2018 by Dr. Varun Noyola for complaints of constipation.  She underwent a screening colonoscopy at that time which was unremarkable.  Repeat colonoscopy was recommended in 10 years for screening purposes.  She followed up in the office in 2021 with complaints of constipation, bloating and lower abdominal discomfort.  She was recommended a low FODMAP diet and bowel regimen using MiraLAX daily.  She was seen in the office in May 2022 following an episode of diverticulitis which was treated with Cipro, Flagyl and Augmentin.  She was recommended an antispasmodic to use as needed and continue bowel regimen with MiraLAX.  A colonoscopy was a consideration.  She return to clinic in October 2023 for continued complaints of lower abdominal pain and constipation.  She was again provided a bowel cleanse with MiraLAX and Gatorade followed by Linzess daily.  She was seen more urgently in the office in December 2023 for constipation predominant bowel habits and bloating.  She was recommended consistent use of Linzess 145 mcg daily to every other day instead of as needed.  She continues with complaints of constipation, and bloating/ increased flatulence. She is taking Linzess 72 mcg daily. On this regimen, she is able to have a bowel movement daily. She does note some LLQ discomfort characterized as a soreness. Levsin helps with pain, and Linzess keeps her bowels moving. She tells me that she does not have a good diet. She is trying to eat more salads.  Today's visit, 4/3/24: She made today's appointment secondary to persistent complaints of pain in the left lower quadrant. She did not ever complete the CT scan ordered at last visit. Pain is characterized as an aching sensation. Sometimes the pain can radiate to her vagina. She states that she saw her GYN who felt the pain is related to GI. There is no change in pain with passing gas or having a bowel movement. The pain can come and go. The pain is not worse after meals. She is taking Linzess 72 mcg each day with use of Linzess in the afternoon. She did not complete bloodwork as ordered either.

## 2024-04-08 ENCOUNTER — OV EP (OUTPATIENT)
Dept: URBAN - METROPOLITAN AREA CLINIC 113 | Facility: CLINIC | Age: 64
End: 2024-04-08

## 2024-04-26 ENCOUNTER — OV EP (OUTPATIENT)
Dept: URBAN - METROPOLITAN AREA CLINIC 113 | Facility: CLINIC | Age: 64
End: 2024-04-26

## 2024-06-06 ENCOUNTER — OFFICE VISIT (OUTPATIENT)
Dept: URBAN - METROPOLITAN AREA CLINIC 113 | Facility: CLINIC | Age: 64
End: 2024-06-06
Payer: COMMERCIAL

## 2024-06-06 ENCOUNTER — DASHBOARD ENCOUNTERS (OUTPATIENT)
Age: 64
End: 2024-06-06

## 2024-06-06 VITALS
BODY MASS INDEX: 28.36 KG/M2 | WEIGHT: 170.2 LBS | SYSTOLIC BLOOD PRESSURE: 128 MMHG | TEMPERATURE: 97.7 F | RESPIRATION RATE: 20 BRPM | HEART RATE: 64 BPM | HEIGHT: 65 IN | DIASTOLIC BLOOD PRESSURE: 78 MMHG

## 2024-06-06 DIAGNOSIS — K59.01 CONSTIPATION BY DELAYED COLONIC TRANSIT: ICD-10-CM

## 2024-06-06 PROCEDURE — 99213 OFFICE O/P EST LOW 20 MIN: CPT | Performed by: NURSE PRACTITIONER

## 2024-06-06 RX ORDER — LINACLOTIDE 145 UG/1
1 CAPSULE AT LEAST 30 MINUTES BEFORE THE FIRST MEAL OF THE DAY ON AN EMPTY STOMACH CAPSULE, GELATIN COATED ORAL ONCE A DAY
Qty: 30 | Refills: 3 | Status: ON HOLD | COMMUNITY

## 2024-06-06 RX ORDER — HYOSCYAMINE SULFATE 0.12 MG/1
1 TABLET UNDER THE TONGUE AND ALLOW TO DISSOLVE  AS NEEDED TABLET SUBLINGUAL
Qty: 90 | Refills: 3 | Status: ON HOLD | COMMUNITY

## 2024-06-06 RX ORDER — SIMVASTATIN 40 MG/1
TAKE 1 TABLET DAILY AS DIRECTED TABLET, FILM COATED ORAL
Refills: 0 | Status: ON HOLD | COMMUNITY

## 2024-06-06 RX ORDER — LINACLOTIDE 72 UG/1
1 CAPSULE AT LEAST 30 MINUTES BEFORE THE FIRST MEAL OF THE DAY ON AN EMPTY STOMACH CAPSULE, GELATIN COATED ORAL ONCE A DAY
Status: ACTIVE | COMMUNITY

## 2024-06-06 RX ORDER — PSYLLIUM SEED
TAKE AS DIRECTED PACKET (EA) ORAL
Refills: 0 | Status: ACTIVE | COMMUNITY

## 2024-06-06 RX ORDER — PANTOPRAZOLE SODIUM 40 MG
TAKE 1 TABLET DAILY TABLET, DELAYED RELEASE (ENTERIC COATED) ORAL
Refills: 0 | Status: ACTIVE | COMMUNITY

## 2024-06-06 RX ORDER — HYDROCHLOROTHIAZIDE 25 MG/1
TAKE 1 TABLET DAILY TABLET ORAL
Refills: 0 | Status: ACTIVE | COMMUNITY

## 2024-06-06 RX ORDER — HYOSCYAMINE SULFATE 0.12 MG/1
ALLOW 1 TABLET UNDER THE TONGUE 3 TIMES A DAY AS NEEDED TABLET ORAL; SUBLINGUAL
Qty: 90 TABLET | Refills: 4 | Status: ON HOLD | COMMUNITY

## 2024-06-06 RX ORDER — METOPROLOL SUCCINATE 50 MG/1
TAKE 1 TABLET TWICE DAILY TABLET, EXTENDED RELEASE ORAL
Refills: 0 | Status: ACTIVE | COMMUNITY

## 2024-06-06 RX ORDER — EVOLOCUMAB 140 MG/ML
1 ML INJECTION, SOLUTION SUBCUTANEOUS
Status: ACTIVE | COMMUNITY

## 2024-06-06 RX ORDER — LINACLOTIDE 72 UG/1
1 CAPSULE AT LEAST 30 MINUTES BEFORE THE FIRST MEAL OF THE DAY ON AN EMPTY STOMACH CAPSULE, GELATIN COATED ORAL ONCE A DAY
OUTPATIENT

## 2024-06-06 RX ORDER — DULAGLUTIDE 3 MG/.5ML
AS DIRECTED INJECTION, SOLUTION SUBCUTANEOUS
Status: ON HOLD | COMMUNITY

## 2024-06-06 RX ORDER — BEMPEDOIC ACID AND EZETIMIBE 180; 10 MG/1; MG/1
1 TABLET TABLET, FILM COATED ORAL ONCE A DAY
Status: ACTIVE | COMMUNITY

## 2024-06-06 RX ORDER — METFORMIN HYDROCHLORIDE 500 MG/1
TAKE 1 TABLET EVERY 12 HOURS WITH FOOD TABLET, COATED ORAL
Refills: 0 | Status: ACTIVE | COMMUNITY

## 2024-07-24 ENCOUNTER — TELEPHONE ENCOUNTER (OUTPATIENT)
Dept: URBAN - METROPOLITAN AREA CLINIC 113 | Facility: CLINIC | Age: 64
End: 2024-07-24

## 2024-07-24 RX ORDER — LINACLOTIDE 145 UG/1
1 CAPSULE AT LEAST 30 MINUTES BEFORE THE FIRST MEAL OF THE DAY ON AN EMPTY STOMACH CAPSULE, GELATIN COATED ORAL ONCE A DAY
Qty: 90 | Refills: 3 | OUTPATIENT
Start: 2024-07-24 | End: 2025-07-19

## 2024-08-16 ENCOUNTER — OFFICE VISIT (OUTPATIENT)
Dept: URBAN - METROPOLITAN AREA CLINIC 113 | Facility: CLINIC | Age: 64
End: 2024-08-16

## 2024-09-04 ENCOUNTER — OFFICE VISIT (OUTPATIENT)
Dept: URBAN - METROPOLITAN AREA CLINIC 113 | Facility: CLINIC | Age: 64
End: 2024-09-04
Payer: COMMERCIAL

## 2024-09-04 ENCOUNTER — LAB OUTSIDE AN ENCOUNTER (OUTPATIENT)
Dept: URBAN - METROPOLITAN AREA CLINIC 113 | Facility: CLINIC | Age: 64
End: 2024-09-04

## 2024-09-04 VITALS
HEART RATE: 57 BPM | HEIGHT: 65 IN | TEMPERATURE: 97.3 F | WEIGHT: 169.2 LBS | DIASTOLIC BLOOD PRESSURE: 84 MMHG | RESPIRATION RATE: 12 BRPM | SYSTOLIC BLOOD PRESSURE: 134 MMHG | BODY MASS INDEX: 28.19 KG/M2

## 2024-09-04 DIAGNOSIS — K59.01 CONSTIPATION BY DELAYED COLONIC TRANSIT: ICD-10-CM

## 2024-09-04 DIAGNOSIS — R10.32 LEFT LOWER QUADRANT PAIN: ICD-10-CM

## 2024-09-04 DIAGNOSIS — R14.0 ABDOMINAL BLOATING: ICD-10-CM

## 2024-09-04 PROCEDURE — 99214 OFFICE O/P EST MOD 30 MIN: CPT | Performed by: NURSE PRACTITIONER

## 2024-09-04 RX ORDER — HYOSCYAMINE SULFATE 0.12 MG/1
1 TABLET UNDER THE TONGUE AND ALLOW TO DISSOLVE  AS NEEDED TABLET SUBLINGUAL
Qty: 90 | Refills: 3 | Status: ON HOLD | COMMUNITY

## 2024-09-04 RX ORDER — LINACLOTIDE 72 UG/1
1 CAPSULE AT LEAST 30 MINUTES BEFORE THE FIRST MEAL OF THE DAY ON AN EMPTY STOMACH CAPSULE, GELATIN COATED ORAL ONCE A DAY
Status: ON HOLD | COMMUNITY

## 2024-09-04 RX ORDER — LINACLOTIDE 145 UG/1
1 CAPSULE AT LEAST 30 MINUTES BEFORE THE FIRST MEAL OF THE DAY ON AN EMPTY STOMACH CAPSULE, GELATIN COATED ORAL ONCE A DAY
Qty: 90 | Refills: 3 | Status: ACTIVE | COMMUNITY
Start: 2024-07-24 | End: 2025-07-19

## 2024-09-04 RX ORDER — EVOLOCUMAB 140 MG/ML
1 ML INJECTION, SOLUTION SUBCUTANEOUS
Status: ACTIVE | COMMUNITY

## 2024-09-04 RX ORDER — SIMVASTATIN 40 MG/1
TAKE 1 TABLET DAILY AS DIRECTED TABLET, FILM COATED ORAL
Refills: 0 | Status: ON HOLD | COMMUNITY

## 2024-09-04 RX ORDER — PANTOPRAZOLE SODIUM 40 MG
TAKE 1 TABLET DAILY TABLET, DELAYED RELEASE (ENTERIC COATED) ORAL
Refills: 0 | Status: ACTIVE | COMMUNITY

## 2024-09-04 RX ORDER — LINACLOTIDE 145 UG/1
1 CAPSULE AT LEAST 30 MINUTES BEFORE THE FIRST MEAL OF THE DAY ON AN EMPTY STOMACH CAPSULE, GELATIN COATED ORAL ONCE A DAY
Qty: 30 | Refills: 3 | Status: ON HOLD | COMMUNITY

## 2024-09-04 RX ORDER — HYDROCHLOROTHIAZIDE 25 MG/1
TAKE 1 TABLET DAILY TABLET ORAL
Refills: 0 | Status: ACTIVE | COMMUNITY

## 2024-09-04 RX ORDER — METOPROLOL SUCCINATE 50 MG/1
TAKE 1 TABLET TWICE DAILY TABLET, EXTENDED RELEASE ORAL
Refills: 0 | Status: ACTIVE | COMMUNITY

## 2024-09-04 RX ORDER — HYOSCYAMINE SULFATE 0.12 MG/1
ALLOW 1 TABLET UNDER THE TONGUE 3 TIMES A DAY AS NEEDED TABLET ORAL; SUBLINGUAL
Qty: 90 TABLET | Refills: 4 | Status: ON HOLD | COMMUNITY

## 2024-09-04 RX ORDER — DULAGLUTIDE 3 MG/.5ML
AS DIRECTED INJECTION, SOLUTION SUBCUTANEOUS
Status: ON HOLD | COMMUNITY

## 2024-09-04 RX ORDER — PSYLLIUM SEED
TAKE AS DIRECTED PACKET (EA) ORAL
Refills: 0 | Status: ACTIVE | COMMUNITY

## 2024-09-04 RX ORDER — METFORMIN HYDROCHLORIDE 500 MG/1
TAKE 1 TABLET EVERY 12 HOURS WITH FOOD TABLET, COATED ORAL
Refills: 0 | Status: ACTIVE | COMMUNITY

## 2024-09-04 RX ORDER — BEMPEDOIC ACID AND EZETIMIBE 180; 10 MG/1; MG/1
1 TABLET TABLET, FILM COATED ORAL ONCE A DAY
Status: ACTIVE | COMMUNITY

## 2024-09-04 RX ORDER — LINACLOTIDE 145 UG/1
1 CAPSULE AT LEAST 30 MINUTES BEFORE THE FIRST MEAL OF THE DAY ON AN EMPTY STOMACH CAPSULE, GELATIN COATED ORAL ONCE A DAY
OUTPATIENT
Start: 2024-07-24

## 2024-09-04 NOTE — HPI-TODAY'S VISIT:
63 yo woman with a history of constipation, LLQ pain and bloating, presenting for follow up. She was seen in the office in June 2024 with report of improved bowel habits using Linzess 72 mcg daily.  She has increased her Linzess 145 mcg daily, and is having more regular bowel movements, each day. She is taking this daily. She is not on a fiber supplement. No abdominal pain. No blood per rectum. There can be some left lower abdominal pain. She also feels that her left lower abdomen is larger than the right side. There is no nausea or vomiting. Her weight is stable.  Her appetite is fair.

## 2024-10-22 ENCOUNTER — OFFICE VISIT (OUTPATIENT)
Dept: URBAN - METROPOLITAN AREA SURGERY CENTER 25 | Facility: SURGERY CENTER | Age: 64
End: 2024-10-22

## 2024-11-20 ENCOUNTER — OFFICE VISIT (OUTPATIENT)
Dept: URBAN - METROPOLITAN AREA CLINIC 113 | Facility: CLINIC | Age: 64
End: 2024-11-20

## 2024-12-06 ENCOUNTER — OFFICE VISIT (OUTPATIENT)
Dept: URBAN - METROPOLITAN AREA CLINIC 113 | Facility: CLINIC | Age: 64
End: 2024-12-06